# Patient Record
(demographics unavailable — no encounter records)

---

## 2025-04-28 NOTE — PHYSICAL EXAM
[Normal Sclera/Conjunctiva] : normal sclera/conjunctiva [No Edema] : there was no peripheral edema [No Extremity Clubbing/Cyanosis] : no extremity clubbing/cyanosis [Normal] : no joint swelling and grossly normal strength and tone [Coordination Grossly Intact] : coordination grossly intact [No Focal Deficits] : no focal deficits [Normal Gait] : normal gait [de-identified] : Full NT ROM BL UE

## 2025-04-28 NOTE — HISTORY OF PRESENT ILLNESS
[de-identified] : Presenting for CPE. He is taking Losartan 100mg and amlodipine 10mg daily. Tolerating well. Taking Flomax 0.8mg. He takes it in the morning because he can't sleep when he takes it at night. Continues o have nocturia. No daytime symptoms with Flomax. seen by urology.  On Zetia and lipitor for HLD.  On vyvanse and abilify for ADHD. Following with Psych NP. Lan Reid @ the office of Dr. Markie Hercules.   On Enbrel for psoriatic arthritis. Following with rheum Dr. Lyman.

## 2025-04-28 NOTE — HEALTH RISK ASSESSMENT
[Monthly or less (1 pt)] : Monthly or less (1 point) [1 or 2 (0 pts)] : 1 or 2 (0 points) [Never (0 pts)] : Never (0 points) [Yes] : Reviewed medication list for presence of high-risk medications. [Current] : Current [10-14] : 10-14 [With Family] : lives with family [Employed] : employed [] :  [Fully functional (bathing, dressing, toileting, transferring, walking, feeding)] : Fully functional (bathing, dressing, toileting, transferring, walking, feeding) [Fully functional (using the telephone, shopping, preparing meals, housekeeping, doing laundry, using] : Fully functional and needs no help or supervision to perform IADLs (using the telephone, shopping, preparing meals, housekeeping, doing laundry, using transportation, managing medications and managing finances) [Audit-CScore] : 1 [de-identified] : smokes less than halp a pack a day for 10-15 years. Recently started smoking occasionally in social situations.  [Reports changes in hearing] : Reports no changes in hearing [FreeTextEntry2] :  [place] [employer] [place]

## 2025-07-11 NOTE — HISTORY OF PRESENT ILLNESS
[No Pertinent Cardiac History] : no history of aortic stenosis, atrial fibrillation, coronary artery disease, recent myocardial infarction, or implantable device/pacemaker [Sleep Apnea] : sleep apnea [No Adverse Anesthesia Reaction] : no adverse anesthesia reaction in self or family member [(Patient denies any chest pain, claudication, dyspnea on exertion, orthopnea, palpitations or syncope)] : Patient denies any chest pain, claudication, dyspnea on exertion, orthopnea, palpitations or syncope [____ METs%] : [unfilled] METs% [Good (7-10 METs)] : Good (7-10 METs) [Aortic Stenosis] : no aortic stenosis [Atrial Fibrillation] : no atrial fibrillation [Coronary Artery Disease] : no coronary artery disease [Recent Myocardial Infarction] : no recent myocardial infarction [Implantable Device/Pacemaker] : no implantable device/pacemaker [Asthma] : no asthma [COPD] : no COPD [Smoker] : not a smoker [Chronic Anticoagulation] : no chronic anticoagulation [Chronic Kidney Disease] : no chronic kidney disease [Diabetes] : no diabetes [FreeTextEntry1] : Knee Surgery Left  [FreeTextEntry2] : 7/18/2025 [FreeTextEntry3] : Dr Fishman  [FreeTextEntry4] : Patient has been having left knee pain x 5 weeks, getting better and worse, no known injury, pain 2-3 out of 10 Meniscus repair  [FreeTextEntry8] : Patient reports he is able to walk to block on level ground, climb a flight of stairs or walk up a hill, or heavy work around the house without shortness of breath

## 2025-07-11 NOTE — RESULTS/DATA
[] : results reviewed [de-identified] : pending  [de-identified] : pending [de-identified] : pending  [de-identified] : cardiology clearance required referral placed

## 2025-07-11 NOTE — PLAN
[FreeTextEntry1] : lab work done in office encouraged to follow up on results  NSquip pre-op 0.1-0.2 cardiac risk  patient verbalized understanding and agreeable to plan  follow up if concerns worsen

## 2025-07-11 NOTE — ASSESSMENT
[High Risk Surgery - Intraperitoneal, Intrathoracic or Supringuinal Vascular Procedures] : High Risk Surgery - Intraperitoneal, Intrathoracic or Supringuinal Vascular Procedures - No (0) [Ischemic Heart Disease] : Ischemic Heart Disease - No (0) [Congestive Heart Failure] : Congestive Heart Failure - No (0) [Prior Cerebrovascular Accident or TIA] : Prior Cerebrovascular Accident or TIA - No (0) [Creatinine >= 2mg/dL (1 Point)] : Creatinine >= 2mg/dL - No (0) [Insulin-dependent Diabetic (1 Point)] : Insulin-dependent Diabetic - No (0) [0] : 0 , RCRI Class: I, Risk of Post-Op Cardiac Complications: 3.9%, 95% CI for Risk Estimate: 2.8% - 5.4% [Patient NOT optimized for Surgery at this time] : Patient not optimized for surgery at this time [Cardiology consultation] : Cardiology consultation [As per surgery] : as per surgery [FreeTextEntry4] : Indication for pre-op discussed with patient to stratify risk factors for related procedure. should discuss surgery and anesthesiology risks related with the providers.  Due to change in EKG and other risk factors patient encouraged to follow up with cardiologist prior to surgery for clearance     [FreeTextEntry7] : as per surgery

## 2025-07-30 NOTE — HISTORY OF PRESENT ILLNESS
[(Patient denies any chest pain, claudication, dyspnea on exertion, orthopnea, palpitations or syncope)] : Patient denies any chest pain, claudication, dyspnea on exertion, orthopnea, palpitations or syncope [FreeTextEntry1] : Meniscus repair.  [FreeTextEntry4] : Mr. Gabriel is a 60 YOM wtih PMHx. HTN, HLD, PSA and BPH. Feels well.  Current smoker - 6-7 cigarettes a day.